# Patient Record
Sex: FEMALE | Race: WHITE | NOT HISPANIC OR LATINO | Employment: OTHER | URBAN - METROPOLITAN AREA
[De-identification: names, ages, dates, MRNs, and addresses within clinical notes are randomized per-mention and may not be internally consistent; named-entity substitution may affect disease eponyms.]

---

## 2023-08-19 ENCOUNTER — HOSPITAL ENCOUNTER (OUTPATIENT)
Facility: HOSPITAL | Age: 83
Discharge: HOME | End: 2023-08-19
Attending: EMERGENCY MEDICINE | Admitting: EMERGENCY MEDICINE
Payer: MEDICARE

## 2023-08-19 ENCOUNTER — APPOINTMENT (OUTPATIENT)
Dept: GENERAL RADIOLOGY | Facility: HOSPITAL | Age: 83
End: 2023-08-19
Payer: MEDICARE

## 2023-08-19 VITALS
WEIGHT: 130 LBS | BODY MASS INDEX: 20.89 KG/M2 | TEMPERATURE: 98.1 F | RESPIRATION RATE: 18 BRPM | HEIGHT: 66 IN | HEART RATE: 82 BPM | DIASTOLIC BLOOD PRESSURE: 56 MMHG | SYSTOLIC BLOOD PRESSURE: 145 MMHG | OXYGEN SATURATION: 97 %

## 2023-08-19 DIAGNOSIS — S63.501A SPRAIN OF RIGHT WRIST, INITIAL ENCOUNTER: Primary | ICD-10-CM

## 2023-08-19 DIAGNOSIS — S60.211A CONTUSION OF RIGHT WRIST, INITIAL ENCOUNTER: ICD-10-CM

## 2023-08-19 DIAGNOSIS — S60.221A CONTUSION OF RIGHT HAND, INITIAL ENCOUNTER: ICD-10-CM

## 2023-08-19 PROCEDURE — 73130 X-RAY EXAM OF HAND: CPT

## 2023-08-19 PROCEDURE — 73110 X-RAY EXAM OF WRIST: CPT

## 2023-08-19 PROCEDURE — G0463 HOSPITAL OUTPT CLINIC VISIT: HCPCS | Performed by: EMERGENCY MEDICINE

## 2023-08-19 NOTE — DISCHARGE INSTRUCTIONS
Wear splint as needed for comfort.  Rest and elevate.  Apply cool compress to sore areas for the next 2 or 3 days.  Follow-up with orthopedics for reevaluation if pain persist.  Seek immediate medical attention at any time if having any concerns.

## 2023-08-19 NOTE — FSED PROVIDER NOTE
Subjective   History of Present Illness  Patient a pleasant 82-year-old woman who presents complaining of pain to the right wrist and right hand after she slipped and fell forward landing on her outstretched right hand.  Pain is dull aching and mild to moderate intensity worse with movement and better with immobilization.  There is no swelling or open wounds or numbness or weakness or gross deformity.  Patient denies any elbow pain although she states she is having some muscle soreness now going up into the upper arm.  Incident occurred approximate 1 hour prior to arrival.    Review of Systems    History reviewed. No pertinent past medical history.    Allergies   Allergen Reactions    Codeine Hives    Sulfa Antibiotics Hives       History reviewed. No pertinent surgical history.    History reviewed. No pertinent family history.    Social History     Socioeconomic History    Marital status:    Tobacco Use    Smoking status: Never    Smokeless tobacco: Never   Vaping Use    Vaping Use: Never used   Substance and Sexual Activity    Alcohol use: Never    Drug use: Never    Sexual activity: Defer           Objective   Physical Exam  Vitals and nursing note reviewed.   Constitutional:       General: She is not in acute distress.     Appearance: Normal appearance. She is not ill-appearing or toxic-appearing.   HENT:      Head: Normocephalic and atraumatic.      Nose: Nose normal.      Mouth/Throat:      Mouth: Mucous membranes are moist.   Eyes:      Extraocular Movements: Extraocular movements intact.   Cardiovascular:      Pulses: Normal pulses.   Pulmonary:      Effort: Pulmonary effort is normal.   Musculoskeletal:         General: Tenderness present. No swelling or deformity.      Cervical back: Normal range of motion and neck supple. No tenderness.      Comments: Examination of the right wrist and right hand reveal no gross deformity or open wounds or swelling or ecchymosis.  The patient has tenderness to the  dorsum of the wrist into the metacarpals.  No snuffbox tenderness.  Patient is neurovascular intact in the fingers with cap refill less than 2 seconds in the fingertips and 2+ radial pulse present.  No elbow pain or shoulder pain.   Skin:     General: Skin is warm and dry.      Capillary Refill: Capillary refill takes less than 2 seconds.   Neurological:      General: No focal deficit present.      Mental Status: She is alert.      Sensory: No sensory deficit.      Motor: No weakness.       Procedures           ED Course                                           Medical Decision Making  Problems Addressed:  Contusion of right hand, initial encounter: complicated acute illness or injury  Contusion of right wrist, initial encounter: complicated acute illness or injury  Sprain of right wrist, initial encounter: complicated acute illness or injury    Amount and/or Complexity of Data Reviewed  Radiology: ordered.      Patient 82-year-old woman presents with injury to the right wrist and right hand sustained earlier today when she lost her balance and fell forward onto outstretched right hand.  Patient has dorsal tenderness to the wrist and metacarpals without any gross deformity or snuffbox tenderness.  Patient is neurovascular intact in the fingers with cap refill less than 2 seconds.  Patient has 2+ radial pulse present.  X-rays were obtained which I independent reviewed.  Do not see any acute bony injury.  Patient placed in Ace wrap and Velcro wrist splint with thumb spica.  Patient neurovascular intact post application.  Patient advised to follow-up with orthopedics if having continued pain as initial imaging may miss subtle injuries.  Patient will return if any concerns.    Final diagnoses:   Sprain of right wrist, initial encounter   Contusion of right wrist, initial encounter   Contusion of right hand, initial encounter       ED Disposition  ED Disposition       ED Disposition   Discharge    Condition   Stable     Comment   --               Len Brower MD  [address] [facility] [address]  [place] [address] [phone]    Call       Karla Simon MD  [address] [place] [address] [phone]    In 1 week      [place] [address] [address] [place] [id number] [phone]    If symptoms worsen         Medication List      No changes were made to your prescriptions during this visit.

## 2024-08-27 ENCOUNTER — APPOINTMENT (OUTPATIENT)
Dept: GENERAL RADIOLOGY | Facility: HOSPITAL | Age: 84
End: 2024-08-27
Payer: MEDICARE

## 2024-08-27 ENCOUNTER — HOSPITAL ENCOUNTER (OUTPATIENT)
Facility: HOSPITAL | Age: 84
Discharge: HOME | End: 2024-08-27
Attending: EMERGENCY MEDICINE | Admitting: EMERGENCY MEDICINE
Payer: MEDICARE

## 2024-08-27 VITALS
RESPIRATION RATE: 16 BRPM | HEART RATE: 76 BPM | TEMPERATURE: 97.6 F | BODY MASS INDEX: 21.1 KG/M2 | WEIGHT: 131.3 LBS | HEIGHT: 66 IN | OXYGEN SATURATION: 97 % | SYSTOLIC BLOOD PRESSURE: 142 MMHG | DIASTOLIC BLOOD PRESSURE: 52 MMHG

## 2024-08-27 DIAGNOSIS — S93.401A SPRAIN OF RIGHT ANKLE, UNSPECIFIED LIGAMENT, INITIAL ENCOUNTER: Primary | ICD-10-CM

## 2024-08-27 PROCEDURE — 73610 X-RAY EXAM OF ANKLE: CPT

## 2024-08-27 PROCEDURE — G0463 HOSPITAL OUTPT CLINIC VISIT: HCPCS

## 2024-08-27 NOTE — DISCHARGE INSTRUCTIONS
Elevate the extremity when you are at rest.  Ice pack 20 minutes at a time several times a day    Anti-inflammatories including Motrin Aleve or ibuprofen can also take Tylenol as needed.    Ace wrap for comfort for the next 2 to 3 days then switch to your compression dressing.  Can continue to use your cane for ambulation.    Follow-up with orthopedist if you continue to have discomfort  Follow-up with PCP as needed    Return as needed

## 2024-08-27 NOTE — ED NOTES
Patient states twisted R ankle yesterday and has been having increase in pain.  States that pain will radiate up leg as well.  Patient using a cane to walk into room, denied needing wheelchair.  Patient tolerated well.

## 2024-08-27 NOTE — FSED PROVIDER NOTE
"Subjective   History of Present Illness  Chief Complaint: Right ankle pain      HPI: Patient is an 83-year-old female who presents by private vehicle she states yesterday she went to step down from the stairs and rolled her right ankle outward.  She has been wearing her compression stockings and utilizing a cane for ambulation.  She is not on blood thinners she did not hit her head.    PCP: Alfred    History provided by:  Patient      Review of Systems  See above as noted    History reviewed. No pertinent past medical history.    Allergies   Allergen Reactions    Codeine Hives    Sulfa Antibiotics Hives       History reviewed. No pertinent surgical history.    History reviewed. No pertinent family history.    Social History     Socioeconomic History    Marital status:    Tobacco Use    Smoking status: Never    Smokeless tobacco: Never   Vaping Use    Vaping status: Never Used   Substance and Sexual Activity    Alcohol use: Never    Drug use: Never    Sexual activity: Defer           Objective   Physical Exam  Vitals reviewed.   HENT:      Head: Normocephalic.   Eyes:      Pupils: Pupils are equal, round, and reactive to light.   Musculoskeletal:        Legs:       Comments: Swelling noted to the lateral aspect of the right ankle, no crepitus no obvious deformity distal neurovascularly intact.  Good strength.  Negative Pa's test.   Neurological:      General: No focal deficit present.      Mental Status: She is alert and oriented to person, place, and time.         Procedures           ED Course      /52   Pulse 76   Temp 97.6 °F (36.4 °C)   Resp 16   Ht 167.6 cm (66\")   Wt 59.6 kg (131 lb 4.8 oz)   SpO2 97%   BMI 21.19 kg/m²   Labs Reviewed - No data to display  Medications - No data to display  XR Ankle 3+ View Right    Result Date: 8/27/2024  Impression: Right ankle soft tissue swelling. No acute osseous abnormality. Electronically Signed: Charity Ribera MD  8/27/2024 9:02 AM EDT  " "Workstation ID: [id number]                                        Medical Decision Making  Patient presented with above complaints, no physical exam was completed and x-ray was obtained and was negative for fracture there is notable soft tissue swelling which is consistent with physical exam and pain location.  Patient was placed in an Ace wrap we discussed over-the-counter pain relievers as well as nonpharmacological treatment encouraged to follow-up with orthopedist if she continues to have discomfort and utilizing assistive devices for ambulation as needed.  Patient gave verbal understanding and felt comfortable this plan of care, denied further questions or complaints at time of discharge.    /52   Pulse 76   Temp 97.6 °F (36.4 °C)   Resp 16   Ht 167.6 cm (66\")   Wt 59.6 kg (131 lb 4.8 oz)   SpO2 97%   BMI 21.19 kg/m²      Chart review: 4/9/2024 outpatient visit with PCP related to chronic medical problems.      History reviewed. No pertinent past medical history.     Medications: Medications - No data to display     Differentials: Sprain, fracture, dislocation not all inclusive of differentials considered    Radiology interpretation:  X-rays reviewed by me and interpreted by radiologist,   XR Ankle 3+ View Right    Result Date: 8/27/2024  Impression: Right ankle soft tissue swelling. No acute osseous abnormality. Electronically Signed: Charity Ribera MD  8/27/2024 9:02 AM EDT  Workstation ID: [id number]       Lab interpretation: None warranted for this visit    Note Disclaimer: At [facility], we believe that sharing information builds trust and better  relationships. You are receiving this note because you recently visited [facility]. It is possible you will see health information before a provider has talked with you about it. This kind of information can be easy to misunderstand. To help you fully understand what it means for your health, we urge you to discuss this note with your provider. "     Part of this note may be an electronic transcription/translation of spoken language to printed text using the Dragon Dictation System.    Appropriate PPE worn during exam.       Problems Addressed:  Sprain of right ankle, unspecified ligament, initial encounter: complicated acute illness or injury    Amount and/or Complexity of Data Reviewed  Radiology: ordered and independent interpretation performed. Decision-making details documented in ED Course.        Final diagnoses:   Sprain of right ankle, unspecified ligament, initial encounter       ED Disposition  ED Disposition       ED Disposition   Discharge    Condition   Stable    Comment   --               Karla Simon MD  [address] [place] [id number]  [phone]          Chiara John MD  [address] [place] [address]  [phone]               Medication List      No changes were made to your prescriptions during this visit.